# Patient Record
Sex: MALE | URBAN - METROPOLITAN AREA
[De-identification: names, ages, dates, MRNs, and addresses within clinical notes are randomized per-mention and may not be internally consistent; named-entity substitution may affect disease eponyms.]

---

## 2022-01-21 ENCOUNTER — OFFICE VISIT (OUTPATIENT)
Dept: URBAN - METROPOLITAN AREA CLINIC 89 | Facility: CLINIC | Age: 57
End: 2022-01-21
Payer: COMMERCIAL

## 2022-01-21 DIAGNOSIS — E11.9 DIABETES MELLITUS TYPE 2 WITHOUT MENTION OF COMPLICATION: ICD-10-CM

## 2022-01-21 DIAGNOSIS — H57.13 OCULAR PAIN, BILATERAL: Primary | ICD-10-CM

## 2022-01-21 DIAGNOSIS — H18.413 ARCUS SENILIS, BILATERAL: ICD-10-CM

## 2022-01-21 PROCEDURE — 99203 OFFICE O/P NEW LOW 30 MIN: CPT | Performed by: OPTOMETRIST

## 2022-01-21 RX ORDER — PREDNISOLONE ACETATE 10 MG/ML
1 % SUSPENSION/ DROPS OPHTHALMIC
Qty: 5 | Refills: 0 | Status: INACTIVE
Start: 2022-01-21 | End: 2022-02-03

## 2022-01-21 RX ORDER — ATORVASTATIN CALCIUM 10 MG/1
10 MG TABLET, FILM COATED ORAL
Qty: 0 | Refills: 0 | Status: ACTIVE
Start: 2022-01-21

## 2022-01-21 RX ORDER — LISINOPRIL 5 MG/1
5 MG TABLET ORAL
Qty: 0 | Refills: 0 | Status: ACTIVE
Start: 2022-01-21

## 2022-01-21 RX ORDER — ASPIRIN 81 MG/1
81 MG TABLET, CHEWABLE ORAL
Qty: 0 | Refills: 0 | Status: ACTIVE
Start: 2022-01-21

## 2022-01-21 RX ORDER — METFORMIN HYDROCHLORIDE 500 MG/1
500 MG TABLET, FILM COATED ORAL
Qty: 0 | Refills: 0 | Status: ACTIVE
Start: 2022-01-21

## 2022-01-21 RX ORDER — GLYBURIDE 2.5 MG/1
2.5 MG TABLET ORAL
Qty: 0 | Refills: 0 | Status: ACTIVE
Start: 2022-01-21

## 2022-01-21 ASSESSMENT — INTRAOCULAR PRESSURE
OD: 16
OS: 16

## 2022-01-21 NOTE — IMPRESSION/PLAN
Impression: Ocular pain, bilateral: H57.13. Plan: Patient is status post-covid and is experiencing ocular pain, a known long-haul symptom. Will start prednisolone acetate 1% TID OU x 14 days. RTC 2-3wk with Jetty Slider OD for followup.

## 2022-01-21 NOTE — IMPRESSION/PLAN
Impression: Diabetes mellitus Type 2 without mention of complication: W22.0. Plan: Patient has not had a dilated exam in over 5 years and drove himself from [place] to [facility] today. Will schedule dilation with Cole Ambrose OD in 2 weeks.

## 2022-02-14 ENCOUNTER — OFFICE VISIT (OUTPATIENT)
Dept: URBAN - METROPOLITAN AREA CLINIC 3 | Facility: CLINIC | Age: 57
End: 2022-02-14
Payer: COMMERCIAL

## 2022-02-14 DIAGNOSIS — H57.11 OCULAR PAIN, RIGHT EYE: Primary | ICD-10-CM

## 2022-02-14 DIAGNOSIS — H16.223 KERATOCONJUNCTIVITIS SICCA, BILATERAL: ICD-10-CM

## 2022-02-14 PROCEDURE — 99212 OFFICE O/P EST SF 10 MIN: CPT | Performed by: OPTOMETRIST

## 2022-02-14 RX ORDER — VALACYCLOVIR HYDROCHLORIDE 500 MG/1
500 MG TABLET, FILM COATED ORAL
Qty: 30 | Refills: 0 | Status: ACTIVE
Start: 2022-02-14

## 2022-02-14 ASSESSMENT — INTRAOCULAR PRESSURE
OS: 14
OD: 17

## 2022-02-14 NOTE — IMPRESSION/PLAN
Impression: Keratoconjunctivitis sicca, bilateral: J50.837. Plan: Dry eyes account for the patient's complaints. There is no evidence of permanent changes to the cornea. Explained condition does not have a cure and will need artificial tears four times a day OU and Systane gel at bedtime for maintenance.

## 2022-02-14 NOTE — IMPRESSION/PLAN
Impression: Other herpes zoster eye disease: B02.39. Plan: No ocular involvement. Patient has no medication allergies. Prescription for Valtrex 500 PO TID given.

## 2022-02-14 NOTE — IMPRESSION/PLAN
Impression: Ocular pain, right eye: H57.11. Plan: (workers comp. related )Patient completed steroid gtts with no improvement. Patient to discontinue gtts at this time. He describes pain as dull pain within the eye ball and on the head, only on the right side.

## 2022-02-24 ENCOUNTER — OFFICE VISIT (OUTPATIENT)
Dept: URBAN - METROPOLITAN AREA CLINIC 3 | Facility: CLINIC | Age: 57
End: 2022-02-24
Payer: COMMERCIAL

## 2022-02-24 DIAGNOSIS — H43.313 VITREOUS MEMBRANES AND STRANDS, BILATERAL: ICD-10-CM

## 2022-02-24 DIAGNOSIS — B02.39 OTHER HERPES ZOSTER EYE DISEASE: Primary | ICD-10-CM

## 2022-02-24 DIAGNOSIS — H25.13 AGE-RELATED NUCLEAR CATARACT, BILATERAL: ICD-10-CM

## 2022-02-24 PROCEDURE — 99214 OFFICE O/P EST MOD 30 MIN: CPT | Performed by: OPTOMETRIST

## 2022-02-24 ASSESSMENT — INTRAOCULAR PRESSURE
OD: 13
OS: 13

## 2022-02-24 NOTE — IMPRESSION/PLAN
Impression: Ocular pain, bilateral: H57.13. Plan: (W/C) No ophthalmic etiology for headaches/pain in around eye. Follow-up with Primary Care Provider for evaluation and management.

## 2022-02-24 NOTE — IMPRESSION/PLAN
Impression: Age-related nuclear cataract, bilateral: H25.13. Plan: Observe for now without intervention. The patient was advised to contact us if any change or worsening of vision. Detail Level: Simple

## 2022-02-24 NOTE — IMPRESSION/PLAN
Impression: Other herpes zoster eye disease: B02.39. Plan: (W/C))Resolving No ocular involvement. Patient has no medication allergies. Finish Valtrex as given.